# Patient Record
Sex: MALE | Race: BLACK OR AFRICAN AMERICAN
[De-identification: names, ages, dates, MRNs, and addresses within clinical notes are randomized per-mention and may not be internally consistent; named-entity substitution may affect disease eponyms.]

---

## 2020-06-04 ENCOUNTER — HOSPITAL ENCOUNTER (EMERGENCY)
Dept: HOSPITAL 88 - ER | Age: 55
Discharge: HOME | End: 2020-06-04
Payer: OTHER GOVERNMENT

## 2020-06-04 VITALS — HEIGHT: 67 IN | WEIGHT: 315 LBS | BODY MASS INDEX: 49.44 KG/M2

## 2020-06-04 VITALS — DIASTOLIC BLOOD PRESSURE: 83 MMHG | SYSTOLIC BLOOD PRESSURE: 177 MMHG

## 2020-06-04 DIAGNOSIS — Z01.84: ICD-10-CM

## 2020-06-04 DIAGNOSIS — R94.31: Primary | ICD-10-CM

## 2020-06-04 DIAGNOSIS — E66.01: ICD-10-CM

## 2020-06-04 LAB
ALBUMIN SERPL-MCNC: 3.8 G/DL (ref 3.5–5)
ALBUMIN/GLOB SERPL: 0.9 {RATIO} (ref 0.8–2)
ALP SERPL-CCNC: 129 IU/L (ref 40–150)
ALT SERPL-CCNC: 21 IU/L (ref 0–55)
AMPHETAMINES UR QL SCN>1000 NG/ML: NEGATIVE
ANION GAP SERPL CALC-SCNC: 10.3 MMOL/L (ref 8–16)
BASOPHILS # BLD AUTO: 0 10*3/UL (ref 0–0.1)
BASOPHILS NFR BLD AUTO: 0.5 % (ref 0–1)
BENZODIAZ UR QL SCN: NEGATIVE
BUN SERPL-MCNC: 9 MG/DL (ref 7–26)
BUN/CREAT SERPL: 6 (ref 6–25)
CALCIUM SERPL-MCNC: 9.2 MG/DL (ref 8.4–10.2)
CHLORIDE SERPL-SCNC: 105 MMOL/L (ref 98–107)
CK MB SERPL-MCNC: 0.9 NG/ML (ref 0–5)
CK MB SERPL-MCNC: 1.9 NG/ML (ref 0–5)
CK SERPL-CCNC: 651 IU/L (ref 30–200)
CK SERPL-CCNC: 729 IU/L (ref 30–200)
CO2 SERPL-SCNC: 25 MMOL/L (ref 22–29)
DEPRECATED NEUTROPHILS # BLD AUTO: 4 10*3/UL (ref 2.1–6.9)
EGFRCR SERPLBLD CKD-EPI 2021: 58 ML/MIN (ref 60–?)
EOSINOPHIL # BLD AUTO: 0 10*3/UL (ref 0–0.4)
EOSINOPHIL NFR BLD AUTO: 0.6 % (ref 0–6)
ERYTHROCYTE [DISTWIDTH] IN CORD BLOOD: 15 % (ref 11.7–14.4)
GLOBULIN PLAS-MCNC: 4.1 G/DL (ref 2.3–3.5)
GLUCOSE SERPLBLD-MCNC: 210 MG/DL (ref 74–118)
HCT VFR BLD AUTO: 48.9 % (ref 38.2–49.6)
HGB BLD-MCNC: 15.4 G/DL (ref 14–18)
LYMPHOCYTES # BLD: 1.7 10*3/UL (ref 1–3.2)
LYMPHOCYTES NFR BLD AUTO: 27.6 % (ref 18–39.1)
MCH RBC QN AUTO: 26 PG (ref 28–32)
MCHC RBC AUTO-ENTMCNC: 31.5 G/DL (ref 31–35)
MCV RBC AUTO: 82.5 FL (ref 81–99)
MONOCYTES # BLD AUTO: 0.4 10*3/UL (ref 0.2–0.8)
MONOCYTES NFR BLD AUTO: 6.4 % (ref 4.4–11.3)
NEUTS SEG NFR BLD AUTO: 64.7 % (ref 38.7–80)
PCP UR QL SCN: NEGATIVE
PLATELET # BLD AUTO: 162 X10E3/UL (ref 140–360)
POTASSIUM SERPL-SCNC: 4.3 MMOL/L (ref 3.5–5.1)
RBC # BLD AUTO: 5.93 X10E6/UL (ref 4.3–5.7)
SODIUM SERPL-SCNC: 136 MMOL/L (ref 136–145)

## 2020-06-04 PROCEDURE — 83880 ASSAY OF NATRIURETIC PEPTIDE: CPT

## 2020-06-04 PROCEDURE — 87635 SARS-COV-2 COVID-19 AMP PRB: CPT

## 2020-06-04 PROCEDURE — 82550 ASSAY OF CK (CPK): CPT

## 2020-06-04 PROCEDURE — 84484 ASSAY OF TROPONIN QUANT: CPT

## 2020-06-04 PROCEDURE — 36415 COLL VENOUS BLD VENIPUNCTURE: CPT

## 2020-06-04 PROCEDURE — 80307 DRUG TEST PRSMV CHEM ANLYZR: CPT

## 2020-06-04 PROCEDURE — 80053 COMPREHEN METABOLIC PANEL: CPT

## 2020-06-04 PROCEDURE — 71045 X-RAY EXAM CHEST 1 VIEW: CPT

## 2020-06-04 PROCEDURE — 82553 CREATINE MB FRACTION: CPT

## 2020-06-04 PROCEDURE — 99284 EMERGENCY DEPT VISIT MOD MDM: CPT

## 2020-06-04 PROCEDURE — 85025 COMPLETE CBC W/AUTO DIFF WBC: CPT

## 2020-06-04 NOTE — EMERGENCY DEPARTMENT NOTE
History of Present Illnes


History of Present Illness


Chief Complaint:  Chest Pain


History of Present Illness


This is a 54 year old  male arrives to the ED for preop testing 

for his colonoscopy. Patient was receiving EKG when the EKG machine read the 

strip as a STEMI- 


Patient denies any complaints of chest pain, shortness of breath or 

exertional/resting dyspnea. Patient states he wishes to go home


Historian:  Patient


Arrival Mode:  Car


 Required:  No


Onset (how long ago):  unknown (patient has no complaints)


Severity:  unable to specify


Progression:  unable to specify





Past Medical/Family History


Physician Review


I have reviewed the patient's past medical and family history.  Any updates have

been documented here.





Past Medical History


Clinical Suspicion of Infectio:  No


New/Unexplained Change in Ment:  No


Past Medical History:  Hypertension, Diabetes





Social History


Smoking Cessation:  Never Smoker


Alcohol Use:  Social


Any Illegal Drug Use:  No


TB Exposure/Symptoms:  No


Physically hurt or threatened:  No





Family History


Family history of heart diseas:  Yes





Other


Any Pre-Existing Lines (PICC,:  No


Is patient up to date on immun:  Yes





Review of Systems


Review of Systems


Constitutional:  no symptoms


EENTM:  no symptoms


Cardiovascular:  no symptoms


Respiratory:  no symptoms


Gastrointestinal:  no symptoms


Genitourinary:  no symptoms


Musculoskeletal:  no symptoms


Neurological:  no symptoms


Psychological:  no symptoms


Endocrine:  no symptoms


Hematological/Lymphatic:  no symptoms


Review of other systems


All other systems reviewed and negative.





Physical Exam


Related Data


Allergies:  


Coded Allergies:  


     lisinopril (Verified  Allergy, Severe, 6/3/20)


   LIP AND TONGUE SWELLS


Vital signs reviewed:  Yes





Physical Exam


CONSTITUTIONAL





Constitutional:  well-developed, well-nourished, obese


HENT


HENT:  normocephalic, atraumatic, oropharynx clear/moist, nose normal


HENT L/R:  left ext ear normal, right ext ear normal


EYES





Eyes:  PERRL, conjunctivae normal


NECK


Neck:  ROM normal


PULMONARY


Pulmonary:  effort normal, breath sounds normal


CARDIOVASCULAR





Cardiovascular:  regular rhythm, heart sounds normal, capillary refill normal, 

normal rate


GASTROINTESTINAL





Abdominal:  soft, nontender, bowel sounds normal


GENITOURINARY





Genitourinary:  exam deferred


SKIN


Skin:  warm, dry


MUSCULOSKELETAL





Musculoskeletal:  ROM normal


NEUROLOGICAL





Neurological:  alert, oriented x 3, no gross motor or sensory deficits


PSYCHOLOGICAL


Psychological:  mood/affect normal, judgement normal





Results


Laboratory


Laboratory





Laboratory Tests








Test


 6/4/20


11:26








Lab results reviewed:  Yes





Imaging


Imaging results reviewed:  Yes





Procedures


12 Lead ECG Interpretation


Prior ECD tracings:  reviewed


Rhythm:  sinus rhythm


QRS axis:  normal


ST segments normal:  Yes


Clinical Impression:  normal ECG





Critical Care Time


Subsequent provider


I assumed direction of critical care for this patient from another provider of 

my specialty.





Assessment & Plan


Assessment & Plan


Final Impression:  


(1) ABNORMAL ELECTROCARDIOGRAM [ECG] [EKG]


Assessment & Plan


Basic preop labs and follow-up with outpatient PCP


Patient noted to have elevated CPK, 2 L IV fluids done and repeat CBC performed,

 improvement noted. Patient given outpatient cardiology follow-up for further 

workup


Depart Disposition:  HOME, SELF-CARE


Home Meds


Reported Medications


Empagliflozin (Jardiance) 25 Mg Tablet, PO DAILY


   6/3/20


Tadalafil (Tadalafil) 5 Mg Tablet, PO DAILY


   6/3/20


Linaclotide (Linzess) 145 Mcg Capsule, PO DAILY


   6/3/20


Rosuvastatin Calcium (CRESTOR) 10 Mg Tab, 40 MG PO DAILY


   THERAPEUTICALLY SUBSTITUTED WITH SIMVASTATIN 40MG


   6/3/20


Duloxetine Hcl (CYMBALTA) 20 Mg Capcr, 20 MG PO DAILY, #30 CAP


   6/3/20


Doxepin Hcl (DOXEPIN HCL) 25 Mg Capsule, 25 MG PO HS, #30 CAP


   6/3/20


[Vit D2]   No Conflict Check, PO WEEKLY


   6/3/20


Prazosin Hcl (MINIPRESS) 1 Mg Capsule, 1 MG PO HS, CAP


   6/3/20


Trazodone Hcl (TRAZODONE HCL) 50 Mg Tablet, 100 MG PO HS, #30 TAB


   6/3/20


Omeprazole (OMEPRAZOLE) 40 Mg Capsule.dr, 40 MG PO DAILY


   6/3/20


Atenolol (ATENOLOL) 50 Mg Tablet, 100 MG PO HS


   6/3/20


Insulin Degludec (Tresiba) 100 Unit/1 Ml Vial, 45 UNITS SC DAILY


   6/3/20


Insulin Regular, Human (HUMULIN R) 100 Unit/1 Ml Vial, 135 UNITS SC BID


   6/3/20


Liraglutide (VICTOZA 3-LUIS MIGUEL) 0.6 Mg/0.1 Ml Pen.injctr, SC DAILY


   6/3/20











ROBERT LUO DO             Jun 4, 2020 12:29

## 2020-06-04 NOTE — DIAGNOSTIC IMAGING REPORT
EXAM:  CHEST SINGLE (PORTABLE)



DATE: 6/4/2020 12:30 PM  



INDICATION: Chest pain  



COMPARISON: None



FINDINGS:

The trachea is midline. The lungs are symmetrically expanded without evidence

for large focal consolidation, pneumothorax, or significant pleural effusion.



The cardiomediastinal silhouette appears mildly prominent which may be

secondary to technique. The pulmonary vasculature is not engorged. No acute

osseous abnormality is identified.





IMPRESSION:



No acute cardiopulmonary process identified.



Signed by: Dr. Montana Sellers MD on 6/4/2020 1:00 PM

## 2020-06-09 ENCOUNTER — HOSPITAL ENCOUNTER (OUTPATIENT)
Dept: HOSPITAL 88 - OR | Age: 55
Discharge: HOME | End: 2020-06-09
Attending: INTERNAL MEDICINE
Payer: OTHER GOVERNMENT

## 2020-06-09 VITALS — SYSTOLIC BLOOD PRESSURE: 137 MMHG | DIASTOLIC BLOOD PRESSURE: 78 MMHG

## 2020-06-09 DIAGNOSIS — E66.01: ICD-10-CM

## 2020-06-09 DIAGNOSIS — M19.90: ICD-10-CM

## 2020-06-09 DIAGNOSIS — K57.30: ICD-10-CM

## 2020-06-09 DIAGNOSIS — R74.8: ICD-10-CM

## 2020-06-09 DIAGNOSIS — G47.33: ICD-10-CM

## 2020-06-09 DIAGNOSIS — D12.3: ICD-10-CM

## 2020-06-09 DIAGNOSIS — K44.9: ICD-10-CM

## 2020-06-09 DIAGNOSIS — Z88.8: ICD-10-CM

## 2020-06-09 DIAGNOSIS — D12.2: ICD-10-CM

## 2020-06-09 DIAGNOSIS — Z01.810: ICD-10-CM

## 2020-06-09 DIAGNOSIS — K58.1: ICD-10-CM

## 2020-06-09 DIAGNOSIS — E11.9: ICD-10-CM

## 2020-06-09 DIAGNOSIS — Q85.8: ICD-10-CM

## 2020-06-09 DIAGNOSIS — Z79.4: ICD-10-CM

## 2020-06-09 DIAGNOSIS — K64.8: ICD-10-CM

## 2020-06-09 DIAGNOSIS — I10: ICD-10-CM

## 2020-06-09 DIAGNOSIS — K29.50: Primary | ICD-10-CM

## 2020-06-09 DIAGNOSIS — D12.4: ICD-10-CM

## 2020-06-09 DIAGNOSIS — K21.9: ICD-10-CM

## 2020-06-09 PROCEDURE — 43239 EGD BIOPSY SINGLE/MULTIPLE: CPT

## 2020-06-09 PROCEDURE — 82948 REAGENT STRIP/BLOOD GLUCOSE: CPT

## 2020-06-09 PROCEDURE — 45384 COLONOSCOPY W/LESION REMOVAL: CPT

## 2020-06-09 PROCEDURE — 93005 ELECTROCARDIOGRAM TRACING: CPT

## 2020-06-09 PROCEDURE — 45385 COLONOSCOPY W/LESION REMOVAL: CPT

## 2020-06-09 PROCEDURE — 45378 DIAGNOSTIC COLONOSCOPY: CPT

## 2020-06-09 PROCEDURE — 36415 COLL VENOUS BLD VENIPUNCTURE: CPT
